# Patient Record
Sex: MALE | Race: OTHER | HISPANIC OR LATINO | URBAN - METROPOLITAN AREA
[De-identification: names, ages, dates, MRNs, and addresses within clinical notes are randomized per-mention and may not be internally consistent; named-entity substitution may affect disease eponyms.]

---

## 2022-07-05 ENCOUNTER — EMERGENCY (EMERGENCY)
Facility: HOSPITAL | Age: 42
LOS: 1 days | Discharge: HOME | End: 2022-07-05
Attending: EMERGENCY MEDICINE | Admitting: EMERGENCY MEDICINE
Payer: SELF-PAY

## 2022-07-05 VITALS
DIASTOLIC BLOOD PRESSURE: 84 MMHG | RESPIRATION RATE: 17 BRPM | WEIGHT: 149.03 LBS | OXYGEN SATURATION: 98 % | TEMPERATURE: 97 F | HEART RATE: 71 BPM | SYSTOLIC BLOOD PRESSURE: 133 MMHG | HEIGHT: 69 IN

## 2022-07-05 VITALS
SYSTOLIC BLOOD PRESSURE: 123 MMHG | OXYGEN SATURATION: 98 % | DIASTOLIC BLOOD PRESSURE: 64 MMHG | TEMPERATURE: 98 F | RESPIRATION RATE: 18 BRPM | HEART RATE: 82 BPM

## 2022-07-05 DIAGNOSIS — N20.1 CALCULUS OF URETER: ICD-10-CM

## 2022-07-05 DIAGNOSIS — R10.31 RIGHT LOWER QUADRANT PAIN: ICD-10-CM

## 2022-07-05 PROCEDURE — 80053 COMPREHEN METABOLIC PANEL: CPT

## 2022-07-05 PROCEDURE — 85610 PROTHROMBIN TIME: CPT

## 2022-07-05 PROCEDURE — 85730 THROMBOPLASTIN TIME PARTIAL: CPT

## 2022-07-05 PROCEDURE — 36415 COLL VENOUS BLD VENIPUNCTURE: CPT

## 2022-07-05 PROCEDURE — 74176 CT ABD & PELVIS W/O CONTRAST: CPT | Mod: 26,MA

## 2022-07-05 PROCEDURE — 74176 CT ABD & PELVIS W/O CONTRAST: CPT | Mod: MA

## 2022-07-05 PROCEDURE — 76705 ECHO EXAM OF ABDOMEN: CPT

## 2022-07-05 PROCEDURE — 87635 SARS-COV-2 COVID-19 AMP PRB: CPT

## 2022-07-05 PROCEDURE — 85025 COMPLETE CBC W/AUTO DIFF WBC: CPT

## 2022-07-05 PROCEDURE — 96374 THER/PROPH/DIAG INJ IV PUSH: CPT

## 2022-07-05 PROCEDURE — 76705 ECHO EXAM OF ABDOMEN: CPT | Mod: 26

## 2022-07-05 PROCEDURE — 99284 EMERGENCY DEPT VISIT MOD MDM: CPT | Mod: 25

## 2022-07-05 PROCEDURE — 99285 EMERGENCY DEPT VISIT HI MDM: CPT

## 2022-07-05 PROCEDURE — 96375 TX/PRO/DX INJ NEW DRUG ADDON: CPT

## 2022-07-05 RX ORDER — KETOROLAC TROMETHAMINE 30 MG/ML
30 SYRINGE (ML) INJECTION ONCE
Refills: 0 | Status: DISCONTINUED | OUTPATIENT
Start: 2022-07-05 | End: 2022-07-05

## 2022-07-05 RX ORDER — ONDANSETRON 8 MG/1
4 TABLET, FILM COATED ORAL ONCE
Refills: 0 | Status: COMPLETED | OUTPATIENT
Start: 2022-07-05 | End: 2022-07-05

## 2022-07-05 RX ORDER — ONDANSETRON 8 MG/1
1 TABLET, FILM COATED ORAL
Qty: 9 | Refills: 0
Start: 2022-07-05 | End: 2022-07-07

## 2022-07-05 RX ORDER — IBUPROFEN 200 MG
1 TABLET ORAL
Qty: 28 | Refills: 0
Start: 2022-07-05 | End: 2022-07-11

## 2022-07-05 RX ORDER — TAMSULOSIN HYDROCHLORIDE 0.4 MG/1
0.4 CAPSULE ORAL ONCE
Refills: 0 | Status: COMPLETED | OUTPATIENT
Start: 2022-07-05 | End: 2022-07-05

## 2022-07-05 RX ORDER — SODIUM CHLORIDE 9 MG/ML
1000 INJECTION INTRAMUSCULAR; INTRAVENOUS; SUBCUTANEOUS ONCE
Refills: 0 | Status: COMPLETED | OUTPATIENT
Start: 2022-07-05 | End: 2022-07-05

## 2022-07-05 RX ORDER — MORPHINE SULFATE 50 MG/1
4 CAPSULE, EXTENDED RELEASE ORAL ONCE
Refills: 0 | Status: DISCONTINUED | OUTPATIENT
Start: 2022-07-05 | End: 2022-07-05

## 2022-07-05 RX ORDER — TAMSULOSIN HYDROCHLORIDE 0.4 MG/1
1 CAPSULE ORAL
Qty: 7 | Refills: 0
Start: 2022-07-05

## 2022-07-05 RX ADMIN — MORPHINE SULFATE 4 MILLIGRAM(S): 50 CAPSULE, EXTENDED RELEASE ORAL at 11:27

## 2022-07-05 RX ADMIN — Medication 30 MILLIGRAM(S): at 12:01

## 2022-07-05 RX ADMIN — ONDANSETRON 4 MILLIGRAM(S): 8 TABLET, FILM COATED ORAL at 11:27

## 2022-07-05 RX ADMIN — TAMSULOSIN HYDROCHLORIDE 0.4 MILLIGRAM(S): 0.4 CAPSULE ORAL at 15:39

## 2022-07-05 RX ADMIN — SODIUM CHLORIDE 1000 MILLILITER(S): 9 INJECTION INTRAMUSCULAR; INTRAVENOUS; SUBCUTANEOUS at 11:27

## 2022-07-05 NOTE — ED PROVIDER NOTE - NSFOLLOWUPINSTRUCTIONS_ED_ALL_ED_FT
Kidney Stones    Kidney stones (urolithiasis) are crystal deposits that form inside your kidneys. Pain is caused by the stone moving through the urinary tract, causing spasms of the ureter. Drink enough water and fluids to keep your urine clear or pale yellow. This will help you to pass the stone or stone fragments. If provided a strainer, strain all urine and keep all particulate matter and stones for a follow up appointment with a urologist.    SEEK IMMEDIATE MEDICAL CARE IF YOU HAVE ANY OF THE FOLLOWING SYMPTOMS: pain not controlled with medication, fever/chills, worsening vomiting, inability to urinate, or dizziness/lightheadedness.       Dietary Guidelines to Help Prevent Kidney Stones      Kidney stones are deposits of minerals and salts that form inside your kidneys. Your risk of developing kidney stones may be greater depending on your diet, your lifestyle, the medicines you take, and whether you have certain medical conditions. Most people can lower their chances of developing kidney stones by following the instructions below. Your dietitian may give you more specific instructions depending on your overall health and the type of kidney stones you tend to develop.      What are tips for following this plan?      Reading food labels      •Choose foods with "no salt added" or "low-salt" labels. Limit your salt (sodium) intake to less than 1,500 mg a day.    •Choose foods with calcium for each meal and snack. Try to eat about 300 mg of calcium at each meal. Foods that contain 200–500 mg of calcium a serving include:  •8 oz (237 mL) of milk, calcium-fortifiednon-dairy milk, and calcium-fortifiedfruit juice. Calcium-fortified means that calcium has been added to these drinks.      •8 oz (237 mL) of kefir, yogurt, and soy yogurt.      •4 oz (114 g) of tofu.      •1 oz (28 g) of cheese.      •1 cup (150 g) of dried figs.      •1 cup (91 g) of cooked broccoli.      •One 3 oz (85 g) can of sardines or mackerel.        Most people need 1,000–1,500 mg of calcium a day. Talk to your dietitian about how much calcium is recommended for you.    Shopping     •Buy plenty of fresh fruits and vegetables. Most people do not need to avoid fruits and vegetables, even if these foods contain nutrients that may contribute to kidney stones.    •When shopping for convenience foods, choose:  •Whole pieces of fruit.      •Pre-made salads with dressing on the side.      •Low-fat fruit and yogurt smoothies.        •Avoid buying frozen meals or prepared deli foods. These can be high in sodium.      •Look for foods with live cultures, such as yogurt and kefir.      •Choose high-fiber grains, such as whole-wheat breads, oat bran, and wheat cereals.      Cooking     • Do not add salt to food when cooking. Place a salt shaker on the table and allow each person to add his or her own salt to taste.      •Use vegetable protein, such as beans, textured vegetable protein (TVP), or tofu, instead of meat in pasta, casseroles, and soups.      Meal planning   •Eat less salt, if told by your dietitian. To do this:  •Avoid eating processed or pre-made food.      •Avoid eating fast food.      •Eat less animal protein, including cheese, meat, poultry, or fish, if told by your dietitian. To do this:  •Limit the number of times you have meat, poultry, fish, or cheese each week. Eat a diet free of meat at least 2 days a week.      •Eat only one serving each day of meat, poultry, fish, or seafood.      •When you prepare animal protein, cut pieces into small portion sizes. For most meat and fish, one serving is about the size of the palm of your hand.      •Eat at least five servings of fresh fruits and vegetables each day. To do this:  •Keep fruits and vegetables on hand for snacks.      •Eat one piece of fruit or a handful of berries with breakfast.      •Have a salad and fruit at lunch.      •Have two kinds of vegetables at dinner.      •Limit foods that are high in a substance called oxalate. These include:  •Spinach (cooked), rhubarb, beets, sweet potatoes, and Swiss chard.      •Peanuts.      •Potato chips, french fries, and baked potatoes with skin on.      •Nuts and nut products.      •Chocolate.      •If you regularly take a diuretic medicine, make sure to eat at least 1 or 2 servings of fruits or vegetables that are high in potassium each day. These include:  •Avocado.      •Banana.      •Orange, prune, carrot, or tomato juice.      •Baked potato.      •Cabbage.      •Beans and split peas.          Lifestyle      •Drink enough fluid to keep your urine pale yellow. This is the most important thing you can do. Spread your fluid intake throughout the day.    •If you drink alcohol:•Limit how much you use to:  •0–1 drink a day for women who are not pregnant.      •0–2 drinks a day for men.        •Be aware of how much alcohol is in your drink. In the [place] one drink equals one 12 oz bottle of beer (355 mL), one 5 oz glass of wine (148 mL), or one 1½ oz glass of hard liquor (44 mL).        •Lose weight if told by your health care provider. Work with your dietitian to find an eating plan and weight loss strategies that work best for you.      General information   •Talk to your health care provider and dietitian about taking daily supplements. You may be told the following depending on your health and the cause of your kidney stones:  •Not to take supplements with vitamin C.      •To take a calcium supplement.      •To take a daily probiotic supplement.      •To take other supplements such as magnesium, fish oil, or vitamin B6.        •Take over-the-counter and prescription medicines only as told by your health care provider. These include supplements.        What foods should I limit?    Limit your intake of the following foods, or eat them as told by your dietitian.    Vegetables     Spinach. Rhubarb. Beets. Canned vegetables. Pickles. Olives. Baked potatoes with skin.    Grains     Wheat bran. Baked goods. Salted crackers. Cereals high in sugar.    Meats and other proteins     Nuts. Nut butters. Large portions of meat, poultry, or fish. Salted, precooked, or cured meats, such as sausages, meat loaves, and hot dogs.    Dairy     Cheese.    Beverages     Regular soft drinks. Regular vegetable juice.    Seasonings and condiments     Seasoning blends with salt. Salad dressings. Soy sauce. Ketchup. Barbecue sauce.    Other foods     Canned soups. Canned pasta sauce. Casseroles. Pizza. Lasagna. Frozen meals. Potato chips. French fries.    The items listed above may not be a complete list of foods and beverages you should limit. Contact a dietitian for more information.       What foods should I avoid?    Talk to your dietitian about specific foods you should avoid based on the type of kidney stones you have and your overall health.    Fruits     Grapefruit.    The item listed above may not be a complete list of foods and beverages you should avoid. Contact a dietitian for more information.       Summary    •Kidney stones are deposits of minerals and salts that form inside your kidneys.      •You can lower your risk of kidney stones by making changes to your diet.      •The most important thing you can do is drink enough fluid. Drink enough fluid to keep your urine pale yellow.      •Talk to your dietitian about how much calcium you should have each day, and eat less salt and animal protein as told by your dietitian.

## 2022-07-05 NOTE — ED ADULT TRIAGE NOTE - CHIEF COMPLAINT QUOTE
pt BIBA c/o right flank pain, nausea, vomiting and difficulty urinating since this morning. Denies pmh.

## 2022-07-05 NOTE — ED ADULT NURSE NOTE - OBJECTIVE STATEMENT
Pt arrived to ED AAOX4, spont unlab breathing on RA, NAD. PT is c/o L flank pain that started suddenly this morning. Reports nausea and 1 episode of vomiting. Pt reports slower stream when urinating but denies dysuria or hematuria. Pt denies fever, cough, chills, SOB.

## 2022-07-05 NOTE — ED PROVIDER NOTE - CLINICAL SUMMARY MEDICAL DECISION MAKING FREE TEXT BOX
found to have ureteral stone on ct imaging. no signs/symptoms of infection or severe obstruction. plan for supportive care, pain meds, nausea meds, f/u   DC home in NAD with strict return precautions given.

## 2022-07-05 NOTE — ED PROVIDER NOTE - OBJECTIVE STATEMENT
42yo M no PMH  c/o R flank pain that started suddenly this morning. Reports nausea and 1 episode of vomiting. Pt reports slower stream when urinating but denies dysuria or hematuria. Pt denies fever, cough, chills, SOB. Never had this before.   · Presenting Symptoms: PAIN  · Radiation: flank. no radiation into penis/testicles.  No known FH of kidney stones

## 2022-07-05 NOTE — ED PROVIDER NOTE - PATIENT PORTAL LINK FT
You can access the FollowMyHealth Patient Portal offered by [facility] by registering at the following website: [email or web address]. By joining [facility]’s FollowMyHealth portal, you will also be able to view your health information using other applications (apps) compatible with our system.

## 2022-07-05 NOTE — ED PROVIDER NOTE - NSFOLLOWUPCLINICS_GEN_ALL_ED_FT
[facility]  Urology  [address] [place], [address]  [place], [place] [address]  Phone: [phone]  Fax:

## 2022-07-05 NOTE — ED PROVIDER NOTE - PHYSICAL EXAMINATION
CONSTITUTIONAL: Young male, well nourished, in moderate distress  HEAD: Normocephalic; atraumatic.   EYES:  conjunctiva and sclera clear  ENT: normal nose; no rhinorrhea; normal pharynx with no erythema or lesions.   NECK: Supple; non-tender;   CARDIOVASCULAR: Normal S1, S2; no murmurs, rubs, or gallops. Regular rate and rhythm.   RESPIRATORY: Breathing easily; breath sounds clear and equal bilaterally; no wheezes, rhonchi, or rales.  GI: Soft; non-distended; non-tender; no palpable organomegaly. No CVA tenderness  EXT: No cyanosis or edema; N/V intact  SKIN: Normal for age and race; warm; dry; good turgor; no apparent lesions or rash.   NEURO: A & O x 3; face symmetric; grossly unremarkable.   PSYCHOLOGICAL: The patient’s mood and manner are appropriate.

## 2024-10-17 ENCOUNTER — APPOINTMENT (OUTPATIENT)
Dept: ORTHOPEDIC SURGERY | Facility: CLINIC | Age: 44
End: 2024-10-17
Payer: COMMERCIAL

## 2024-10-17 VITALS — WEIGHT: 174 LBS | HEIGHT: 69 IN | BODY MASS INDEX: 25.77 KG/M2

## 2024-10-17 DIAGNOSIS — Z87.891 PERSONAL HISTORY OF NICOTINE DEPENDENCE: ICD-10-CM

## 2024-10-17 DIAGNOSIS — M51.369: ICD-10-CM

## 2024-10-17 PROBLEM — Z00.00 ENCOUNTER FOR PREVENTIVE HEALTH EXAMINATION: Status: ACTIVE | Noted: 2024-10-17

## 2024-10-17 PROCEDURE — 99203 OFFICE O/P NEW LOW 30 MIN: CPT

## 2024-10-17 PROCEDURE — 72110 X-RAY EXAM L-2 SPINE 4/>VWS: CPT

## 2024-12-05 ENCOUNTER — APPOINTMENT (OUTPATIENT)
Dept: ORTHOPEDIC SURGERY | Facility: CLINIC | Age: 44
End: 2024-12-05